# Patient Record
Sex: FEMALE | Race: WHITE | NOT HISPANIC OR LATINO | Employment: PART TIME | URBAN - METROPOLITAN AREA
[De-identification: names, ages, dates, MRNs, and addresses within clinical notes are randomized per-mention and may not be internally consistent; named-entity substitution may affect disease eponyms.]

---

## 2017-08-24 ENCOUNTER — ALLIED HEALTH/NURSE VISIT (OUTPATIENT)
Dept: FAMILY MEDICINE | Facility: CLINIC | Age: 37
End: 2017-08-24
Payer: COMMERCIAL

## 2017-08-24 VITALS — HEIGHT: 63 IN | WEIGHT: 212.7 LBS | BODY MASS INDEX: 37.69 KG/M2

## 2017-08-24 DIAGNOSIS — N91.2 AMENORRHEA: Primary | ICD-10-CM

## 2017-08-24 LAB — BETA HCG QUAL IFA URINE: POSITIVE

## 2017-08-24 PROCEDURE — 84703 CHORIONIC GONADOTROPIN ASSAY: CPT | Performed by: PHYSICIAN ASSISTANT

## 2017-08-24 PROCEDURE — 99207 ZZC NO CHARGE NURSE ONLY: CPT

## 2017-08-24 NOTE — MR AVS SNAPSHOT
"              After Visit Summary   2017    Josette Smith    MRN: [id number]           Patient Information     Date Of Birth          1980        Visit Information        Provider Department      2017 1:30 PM NL RN TEAM A, [facility]        Today's Diagnoses     Amenorrhea    -  1       Follow-ups after your visit        Who to contact     If you have questions or need follow up information about today's clinic visit or your schedule please contact [facility] directly at [phone].  Normal or non-critical lab and imaging results will be communicated to you by MyChart, letter or phone within 4 business days after the clinic has received the results. If you do not hear from us within 7 days, please contact the clinic through [employer] or phone. If you have a critical or abnormal lab result, we will notify you by phone as soon as possible.  Submit refill requests through Queue-it or call your pharmacy and they will forward the refill request to us. Please allow 3 business days for your refill to be completed.          Additional Information About Your Visit        MyChart Information     Queue-it lets you send messages to your doctor, view your test results, renew your prescriptions, schedule appointments and more. To sign up, go to [email or web address] . Click on \"Log in\" on the left side of the screen, which will take you to the Welcome page. Then click on \"Sign up Now\" on the right side of the page.     You will be asked to enter the access code listed below, as well as some personal information. Please follow the directions to create your username and password.     Your access code is: [email or web address]  Expires: 2017  2:05 PM     Your access code will  in 90 days. If you need help or a new code, please call your [facility] or [phone].        Care EveryWhere ID     This is your Care EveryWhere ID. This could be used by other organizations to " "access your [place] medical records  [id number]        Your Vitals Were     Height Last Period BMI (Body Mass Index)             5' 3\" (1.6 m) 06/27/2017 (Exact Date) 37.68 kg/m2          Blood Pressure from Last 3 Encounters:   10/26/16 122/78    Weight from Last 3 Encounters:   08/24/17 212 lb 11.2 oz (96.5 kg)   10/26/16 199 lb (90.3 kg)              We Performed the Following     Beta HCG qual IFA urine        Primary Care Provider Office Phone # Fax #    Diana Velasco PA-C [phone] [address] [place] [address]        Equal Access to Services     ROXY DUMONT : Hadii adi arriagao Soomaali, waaxda luqadaha, qaybta kaalmada adeegyada, seth patterson . So [facility] [phone].    ATENCIÓN: Si habla español, tiene a hitchcock disposición servicios gratuitos de asistencia lingüística. Llame al [phone].    We comply with applicable federal civil rights laws and [place] laws. We do not discriminate on the basis of race, color, national origin, age, disability sex, sexual orientation or gender identity.            Thank you!     Thank you for choosing [facility]  for your care. Our goal is always to provide you with excellent care. Hearing back from our patients is one way we can continue to improve our services. Please take a few minutes to complete the written survey that you may receive in the mail after your visit with us. Thank you!             Your Updated Medication List - Protect others around you: Learn how to safely use, store and throw away your medicines at www.disposemymeds.org.          This list is accurate as of: 8/24/17  2:05 PM.  Always use your most recent med list.                   Brand Name Dispense Instructions for use Diagnosis    ARMOUR THYROID 120 MG tablet   Generic drug:  thyroid      Take 120 mg by mouth daily          "

## 2017-08-24 NOTE — NURSING NOTE
"Josette Smith is a 37 year old here today for a pregnancy test.  LMP: Patient's last menstrual period was 2017 (exact date).  Wt: 212 lbs 11.2 oz.    Symptoms include weight gain, breast tenderness, absence of menses and fatigue.    Josette informed of positive pregnancy test results. MIRNA: 16    Educational advice given: nutrition, smoking and drugs & alcohol.    Current medications reviewed: Yes, just on [place]   Thyroid medications and started PNV recently , like 2 weeks ago     Previous pregnancy history remarkable for:  P:3 \" First delivery was a  [place] #2and #3 V-Backs. They were delivered by Midwives at [facility]..    Plan: schedule appointment with OB Educator and/or OB class, follow-up appointment with Dr. George for pre- care, take multivitamin or pre-byron vitamins, OB Education packet given and  Instructed to sign release of medical information to obtain OB record. .    She is to call back if she has any questions or concerns.  She is advised to notify a provider immediately if she experiences any severe cramping or abdominal pain or any vaginal bleeding.    "

## 2017-08-28 ENCOUNTER — PRENATAL OFFICE VISIT (OUTPATIENT)
Dept: FAMILY MEDICINE | Facility: CLINIC | Age: 37
End: 2017-08-28
Payer: COMMERCIAL

## 2017-08-28 VITALS
BODY MASS INDEX: 37.56 KG/M2 | DIASTOLIC BLOOD PRESSURE: 84 MMHG | WEIGHT: 212 LBS | HEIGHT: 63 IN | SYSTOLIC BLOOD PRESSURE: 120 MMHG

## 2017-08-28 DIAGNOSIS — O09.529 SUPERVISION OF HIGH-RISK PREGNANCY OF ELDERLY MULTIGRAVIDA: Primary | ICD-10-CM

## 2017-08-28 LAB
ABO + RH BLD: NORMAL
ABO + RH BLD: NORMAL
ALBUMIN UR-MCNC: NEGATIVE MG/DL
APPEARANCE UR: NORMAL
BILIRUB UR QL STRIP: NEGATIVE
BLD GP AB SCN SERPL QL: NORMAL
BLOOD BANK CMNT PATIENT-IMP: NORMAL
COLOR UR AUTO: YELLOW
ERYTHROCYTE [DISTWIDTH] IN BLOOD BY AUTOMATED COUNT: 14 % (ref 10–15)
GLUCOSE UR STRIP-MCNC: NEGATIVE MG/DL
HCT VFR BLD AUTO: 43.2 % (ref 35–47)
HGB BLD-MCNC: 13.9 G/DL (ref 11.7–15.7)
HGB UR QL STRIP: NEGATIVE
KETONES UR STRIP-MCNC: NEGATIVE MG/DL
LEUKOCYTE ESTERASE UR QL STRIP: NEGATIVE
MCH RBC QN AUTO: 28.5 PG (ref 26.5–33)
MCHC RBC AUTO-ENTMCNC: 32.2 G/DL (ref 31.5–36.5)
MCV RBC AUTO: 89 FL (ref 78–100)
NITRATE UR QL: NEGATIVE
PH UR STRIP: 7 PH (ref 5–7)
PLATELET # BLD AUTO: 315 10E9/L (ref 150–450)
RBC # BLD AUTO: 4.87 10E12/L (ref 3.8–5.2)
RBC #/AREA URNS AUTO: 0 /HPF (ref 0–2)
SOURCE: NORMAL
SP GR UR STRIP: 1.01 (ref 1–1.03)
SPECIMEN EXP DATE BLD: NORMAL
UROBILINOGEN UR STRIP-MCNC: 0 MG/DL (ref 0–2)
WBC # BLD AUTO: 10.5 10E9/L (ref 4–11)
WBC #/AREA URNS AUTO: <1 /HPF (ref 0–2)

## 2017-08-28 PROCEDURE — 87340 HEPATITIS B SURFACE AG IA: CPT | Performed by: FAMILY MEDICINE

## 2017-08-28 PROCEDURE — 86780 TREPONEMA PALLIDUM: CPT | Performed by: FAMILY MEDICINE

## 2017-08-28 PROCEDURE — 99207 ZZC NO CHARGE NURSE ONLY: CPT

## 2017-08-28 PROCEDURE — 36415 COLL VENOUS BLD VENIPUNCTURE: CPT | Performed by: FAMILY MEDICINE

## 2017-08-28 PROCEDURE — 86901 BLOOD TYPING SEROLOGIC RH(D): CPT | Performed by: FAMILY MEDICINE

## 2017-08-28 PROCEDURE — 87389 HIV-1 AG W/HIV-1&-2 AB AG IA: CPT | Performed by: FAMILY MEDICINE

## 2017-08-28 PROCEDURE — 85027 COMPLETE CBC AUTOMATED: CPT | Performed by: FAMILY MEDICINE

## 2017-08-28 PROCEDURE — 81001 URINALYSIS AUTO W/SCOPE: CPT | Performed by: FAMILY MEDICINE

## 2017-08-28 PROCEDURE — 86762 RUBELLA ANTIBODY: CPT | Performed by: FAMILY MEDICINE

## 2017-08-28 PROCEDURE — 86900 BLOOD TYPING SEROLOGIC ABO: CPT | Performed by: FAMILY MEDICINE

## 2017-08-28 PROCEDURE — 86850 RBC ANTIBODY SCREEN: CPT | Performed by: FAMILY MEDICINE

## 2017-08-28 NOTE — MR AVS SNAPSHOT
After Visit Summary   8/28/2017    Josette Smith    MRN: [id number]           Patient Information     Date Of Birth          1980        Visit Information        Provider Department      8/28/2017 4:00 PM NL OB INTAKE [facility]        Today's Diagnoses     Supervision of high-risk pregnancy of elderly multigravida    -  1       Follow-ups after your visit        Your next 10 appointments already scheduled     Aug 30, 2017 11:15 AM CDT   US OB < 14 WEEKS SINGLE with PHUS1   [facility] Ultrasound ([facility])    [address] [place] [id number] [phone]           Please bring a list of your medicines (including vitamins, minerals and over-the-counter drugs). Also, tell your doctor about any allergies you may have. Wear comfortable clothes and leave your valuables at home.  If you re less than 20 weeks drink four 8-ounce glasses of fluid an hour before your exam. If you need to empty your bladder before your exam, try to release only a little urine. Then, drink another glass of fluid.  You may have up to two family members in the exam room. If you bring a small child, an adult must be there to care for him or her.  Please call the Imaging Department at your exam site with any questions.              Future tests that were ordered for you today     Open Future Orders        Priority Expected Expires Ordered    US OB < 14 Weeks Single Routine  8/28/2018 8/28/2017    Glucose tolerance, gest screen, 1 hour Routine  8/28/2018 8/28/2017            Who to contact     If you have questions or need follow up information about today's clinic visit or your schedule please contact [facility] directly at [phone].  Normal or non-critical lab and imaging results will be communicated to you by MyChart, letter or phone within 4 business days after the clinic has received the results. If you do not hear from us within 7 days, please  "contact the clinic through [employer] or phone. If you have a critical or abnormal lab result, we will notify you by phone as soon as possible.  Submit refill requests through [employer] or call your pharmacy and they will forward the refill request to us. Please allow 3 business days for your refill to be completed.          Additional Information About Your Visit        [employer] Information     [employer] lets you send messages to your doctor, view your test results, renew your prescriptions, schedule appointments and more. To sign up, go to [email or web address] . Click on \"Log in\" on the left side of the screen, which will take you to the Welcome page. Then click on \"Sign up Now\" on the right side of the page.     You will be asked to enter the access code listed below, as well as some personal information. Please follow the directions to create your username and password.     Your access code is: -[email or web address]  Expires: 2017  2:05 PM     Your access code will  in 90 days. If you need help or a new code, please call your [place] clinic or [phone].        Care EveryWhere ID     This is your Care EveryWhere ID. This could be used by other organizations to access your [place] medical records  [id number]        Your Vitals Were     Height Last Period BMI (Body Mass Index)             5' 3\" (1.6 m) 2017 (Exact Date) 37.55 kg/m2          Blood Pressure from Last 3 Encounters:   17 120/84   10/26/16 122/78    Weight from Last 3 Encounters:   17 212 lb (96.2 kg)   17 212 lb 11.2 oz (96.5 kg)   10/26/16 199 lb (90.3 kg)              We Performed the Following     *UA reflex to Microscopic and Culture ([place]; [facility]; [facility]; [facility]; [facility]; [facility]; [place]; [place])     ABO/Rh type and screen     Anti treponema EIA     CBC WITH PLATELETS     HEPATITIS B SURFACE ANTIGEN     HIV Antigen Antibody Combo     Rubella Antibody IgG Quantitative        " Primary Care Provider Office Phone # Fax #    Diana Velasco PA-C [phone] [address] [place] [address]        Equal Access to Services     ROXY DUMONT : Luisa jorge corinao Soyoanali, waaxda luqadaha, qaybta kaalmada adececiliayada, seth vogel laMichaelricki cervantes. So [facility] [phone].    ATENCIÓN: Si habla español, tiene a hitchcock disposición servicios gratuitos de asistencia lingüística. Llame al [phone].    We comply with applicable federal civil rights laws and [place] laws. We do not discriminate on the basis of race, color, national origin, age, disability sex, sexual orientation or gender identity.            Thank you!     Thank you for choosing [facility]  for your care. Our goal is always to provide you with excellent care. Hearing back from our patients is one way we can continue to improve our services. Please take a few minutes to complete the written survey that you may receive in the mail after your visit with us. Thank you!             Your Updated Medication List - Protect others around you: Learn how to safely use, store and throw away your medicines at www.disposemymeds.org.          This list is accurate as of: 8/28/17  7:31 PM.  Always use your most recent med list.                   Brand Name Dispense Instructions for use Diagnosis    ARMOUR THYROID 120 MG tablet   Generic drug:  thyroid      Take 120 mg by mouth daily

## 2017-08-28 NOTE — NURSING NOTE
8w6d  Pt here for first OB intake visit. POC discussed and blue folder reviewed. All questions answered. Early ultrasound for size and dates. This is an unplanned pregnancy and patient is not sure of dates. Pt will need to get a one hour GTT due to risk factors for GD. This is ordered in [facility] and she will get it done at her ultrasound this week or with her appt with Dr George.. She did get her other OB labs done today. She has had three deliveries and one was a . Discussed that our facility does not routinely do VBACS and that she will need to discuss this with her doctor and her case will go for a review to see if she is eligible for a . Pt states understanding and states that if she is not able to do a  here she will transfer care to a facility that does them. She also states that her BP usually runs borderline high. It is 120/84 today.       Harpreet Loyd, RN, BSN

## 2017-08-28 NOTE — PROGRESS NOTES
1. Have you had chicken pox?   Yes    Genetic Screening    Has the patient, baby's father, or anyone in either family had:     1. Thalassemia and and MCV result less than 80?No   2.  Neural tube defect? No   3.  Congenital heart defect?No   4. Down's syndrome?No   5.  Silver-Sachs disease?No   6. Sickle Cell disease or trait?No   7. Hemophilia or other inherited problems of blood?No   8. Muscular dystropy?No   9.  Cystic fibrosis?No  10. [place]'s Chorea?No  11. Mental Retardation or autism?  Yes, Pt has an uncle that was mentally handicapped.         If yes, was the person tested for Fragile X?   12. Any other inherited genetic or chromosomal disorders?No  13. Metabolic disorders such as diabetes or PKU?Yes, pt maternal grandfather had IDDM.  14. A child born with defects not listed above?No  15. Recurrent pregnancy loss or stillbirth?No  16.  Has the patient had any medications/street drugs/alcohol since her last menstrual period?Yes, had a few drinks before she knew she was pregnant.  18.  Does the patient or baby's father have any other genetic risks. No   Baby Friendly Education    Discussed benefits of breastfeeding for mom and baby, risks of artificial feeding, importance of exclusive breastfeeding for the first six months, non pharmacologic pain relief methods for labor, importance of early skin to skin contact, importance of early initiation of breastfeeding, importance of rooming in, importance of early and frequent nursing and effective positioning and attachment.      Harpreet Loyd RN, BSN

## 2017-08-29 LAB
HBV SURFACE AG SERPL QL IA: NONREACTIVE
HIV 1+2 AB+HIV1 P24 AG SERPL QL IA: NONREACTIVE
RUBV IGG SERPL IA-ACNC: 65 IU/ML
T PALLIDUM IGG+IGM SER QL: NEGATIVE

## 2017-08-29 NOTE — PROGRESS NOTES
Notify her that her labs are normal, will review in more detail at her first OB visit.   Angelica George MD

## 2017-08-30 ENCOUNTER — HOSPITAL ENCOUNTER (OUTPATIENT)
Dept: ULTRASOUND IMAGING | Facility: CLINIC | Age: 37
Discharge: HOME | End: 2017-08-30
Attending: FAMILY MEDICINE | Admitting: FAMILY MEDICINE
Payer: COMMERCIAL

## 2017-08-30 DIAGNOSIS — O09.529 SUPERVISION OF HIGH-RISK PREGNANCY OF ELDERLY MULTIGRAVIDA: ICD-10-CM

## 2017-08-30 PROCEDURE — 76801 OB US < 14 WKS SINGLE FETUS: CPT

## 2017-09-01 ENCOUNTER — TELEPHONE (OUTPATIENT)
Dept: FAMILY MEDICINE | Facility: CLINIC | Age: 37
End: 2017-09-01

## 2017-09-01 DIAGNOSIS — O20.0 THREATENED ABORTION: ICD-10-CM

## 2017-09-01 DIAGNOSIS — O20.0 THREATENED ABORTION: Primary | ICD-10-CM

## 2017-09-01 LAB — B-HCG SERPL-ACNC: 7087 IU/L (ref 0–5)

## 2017-09-01 PROCEDURE — 36415 COLL VENOUS BLD VENIPUNCTURE: CPT | Performed by: FAMILY MEDICINE

## 2017-09-01 PROCEDURE — 84702 CHORIONIC GONADOTROPIN TEST: CPT | Performed by: FAMILY MEDICINE

## 2017-09-01 NOTE — TELEPHONE ENCOUNTER
I called Josette with her u/s results.  Baby seen but no definite heartbeat. I'm going to have her come in today for an HCG level, and then will repeat her ultrasound and HCG level next week.  See orders. She is going to call for ultrasound appointment.   Angelica George MD

## 2017-09-05 ENCOUNTER — HOSPITAL ENCOUNTER (OUTPATIENT)
Dept: ULTRASOUND IMAGING | Facility: CLINIC | Age: 37
Discharge: HOME | End: 2017-09-05
Attending: FAMILY MEDICINE | Admitting: FAMILY MEDICINE
Payer: COMMERCIAL

## 2017-09-05 DIAGNOSIS — O02.1 MISSED ABORTION: Primary | ICD-10-CM

## 2017-09-05 DIAGNOSIS — O20.0 THREATENED ABORTION: ICD-10-CM

## 2017-09-05 LAB — B-HCG SERPL-ACNC: 5822 IU/L (ref 0–5)

## 2017-09-05 PROCEDURE — 36415 COLL VENOUS BLD VENIPUNCTURE: CPT | Performed by: FAMILY MEDICINE

## 2017-09-05 PROCEDURE — 76801 OB US < 14 WKS SINGLE FETUS: CPT

## 2017-09-05 PROCEDURE — 84702 CHORIONIC GONADOTROPIN TEST: CPT | Performed by: FAMILY MEDICINE

## 2017-09-05 NOTE — PROGRESS NOTES
Josette notified of miscarriage. She states she doesn't want to do anything at this time. Feels she will pass it on her own.  Would like to update me in 1 week, knows her options of D&C, pills.  Discussed risks of infection, bleeding, symptoms to watch for. Will update me in 1 week. Will get HCG checked every 1-2 weeks until 0. Discussed importance of follow up.   Angelica George MD

## 2018-05-11 ENCOUNTER — OFFICE VISIT (OUTPATIENT)
Dept: FAMILY MEDICINE | Facility: CLINIC | Age: 38
End: 2018-05-11
Payer: COMMERCIAL

## 2018-05-11 ENCOUNTER — HOSPITAL ENCOUNTER (OUTPATIENT)
Dept: GENERAL RADIOLOGY | Facility: CLINIC | Age: 38
Discharge: HOME | End: 2018-05-11
Attending: FAMILY MEDICINE | Admitting: FAMILY MEDICINE
Payer: COMMERCIAL

## 2018-05-11 VITALS
DIASTOLIC BLOOD PRESSURE: 82 MMHG | HEART RATE: 97 BPM | OXYGEN SATURATION: 96 % | TEMPERATURE: 97.5 F | WEIGHT: 215.3 LBS | HEIGHT: 63 IN | BODY MASS INDEX: 38.15 KG/M2 | SYSTOLIC BLOOD PRESSURE: 124 MMHG

## 2018-05-11 DIAGNOSIS — S99.921A INJURY OF TOE, RIGHT, INITIAL ENCOUNTER: ICD-10-CM

## 2018-05-11 DIAGNOSIS — S92.514A CLOSED NONDISPLACED FRACTURE OF PROXIMAL PHALANX OF LESSER TOE OF RIGHT FOOT, INITIAL ENCOUNTER: Primary | ICD-10-CM

## 2018-05-11 PROCEDURE — 73660 X-RAY EXAM OF TOE(S): CPT | Mod: TC,RT

## 2018-05-11 PROCEDURE — 99214 OFFICE O/P EST MOD 30 MIN: CPT | Performed by: FAMILY MEDICINE

## 2018-05-11 ASSESSMENT — PAIN SCALES - GENERAL: PAINLEVEL: SEVERE PAIN (6)

## 2018-05-11 NOTE — PROGRESS NOTES
SUBJECTIVE:   Josette Smith is a 38 year old female who presents to clinic today for the following health issues:      Toe Pain    Onset: 5/10/2018    Description:   Location: right baby toe  Character: Sharp and Dull ache    Intensity: moderate    Progression of Symptoms: worse    Accompanying Signs & Symptoms:  Other symptoms: swelling, redness and discoloration of toe    History:   Previous similar pain: no       Precipitating factors:   Trauma or overuse: YES    Alleviating factors:  Improved by: rest/inactivity and Ibuprofen    Therapies Tried and outcome: ibuprofen, ice            Problem list and histories reviewed & adjusted, as indicated.  Additional history: as documented        Reviewed and updated as needed this visit by clinical staff  Tobacco  Allergies  Meds  Med Hx  Surg Hx  Fam Hx  Soc Hx      Reviewed and updated as needed this visit by Provider        SUBJECTIVE:  Josette  is a 38 year old female who presents for: Injury to her toe.  Jammed her little toe into the corner of a cabinet last night.  Quite painful.  Swollen and ecchymosis this morning.    Past Medical History:   Diagnosis Date     Acute Lyme disease age 12     History of blood transfusion     04, after      Hypothyroid     Hashimoto's     Past Surgical History:   Procedure Laterality Date     ABDOMEN SURGERY             C/SECTION, LOW TRANSVERSE       GENITOURINARY SURGERY      Urethra surgery at age 1     OTHER SURGICAL HISTORY  age 1    Pt had her urethra dilated at 1 yrs of age     Social History   Substance Use Topics     Smoking status: Never Smoker     Smokeless tobacco: Never Used     Alcohol use 0.0 oz/week     0 Standard drinks or equivalent per week      Comment: On occasion      Current Outpatient Prescriptions   Medication Sig Dispense Refill     thyroid (ARMOUR THYROID) 120 MG tablet Take 120 mg by mouth daily         REVIEW OF SYSTEMS:   5 point ROS negative except as noted  "above in HPI, including Gen., Resp, CV, GI &  system review.     OBJECTIVE:  Vitals: /82 (Cuff Size: Adult Large)  Pulse 97  Temp 97.5  F (36.4  C) (Temporal)  Ht 5' 3\" (1.6 m)  Wt 215 lb 4.8 oz (97.7 kg)  LMP 06/27/2017 (Exact Date)  SpO2 96%  Breastfeeding? No  BMI 38.14 kg/m2  BMI= Body mass index is 38.14 kg/(m^2).  She is appears in no distress.  Toe is swollen there is some ecchymosis and no open area.  Alignment looks normal.  X-ray shows a transverse fracture across the base of the proximal phalanx nondisplaced.    ASSESSMENT:  Nondisplaced fracture of the proximal phalanx of the little toe    PLAN:  Edmundo tape stiff soled sandal follow-up if not improving.  She is using ibuprofen for pain which is helpful.        Edwardo Nation MD  [facility]              "

## 2018-05-11 NOTE — MR AVS SNAPSHOT
"              After Visit Summary   5/11/2018    Josette Smith    MRN: [id number]           Patient Information     Date Of Birth          1980        Visit Information        Provider Department      5/11/2018 8:20 AM Edwardo Nation MD [facility]        Today's Diagnoses     Closed nondisplaced fracture of proximal phalanx of lesser toe of right foot, initial encounter    -  1    Injury of toe, right, initial encounter           Follow-ups after your visit        Future tests that were ordered for you today     Open Future Orders        Priority Expected Expires Ordered    X-RAY TOE RIGHT G/E 2 VIEWS [[id number]] Routine 5/11/2018 5/11/2019 5/11/2018            Who to contact     If you have questions or need follow up information about today's clinic visit or your schedule please contact [facility] directly at [phone].  Normal or non-critical lab and imaging results will be communicated to you by MyChart, letter or phone within 4 business days after the clinic has received the results. If you do not hear from us within 7 days, please contact the clinic through MyChart or phone. If you have a critical or abnormal lab result, we will notify you by phone as soon as possible.  Submit refill requests through [employer] or call your pharmacy and they will forward the refill request to us. Please allow 3 business days for your refill to be completed.          Additional Information About Your Visit        MyChart Information     [employer] lets you send messages to your doctor, view your test results, renew your prescriptions, schedule appointments and more. To sign up, go to [email or web address] . Click on \"Log in\" on the left side of the screen, which will take you to the Welcome page. Then click on \"Sign up Now\" on the right side of the page.     You will be asked to enter the access code listed below, as well as some personal information. Please follow the directions to create " "your username and password.     Your access code is: [email or web address]  Expires: 2018  9:24 AM     Your access code will  in 90 days. If you need help or a new code, please call your [place] clinic or [phone].        Care EveryWhere ID     This is your Care EveryWhere ID. This could be used by other organizations to access your [place] medical records  [id number]        Your Vitals Were     Pulse Temperature Height Last Period Pulse Oximetry Breastfeeding?    97 97.5  F (36.4  C) (Temporal) 5' 3\" (1.6 m) 2017 (Exact Date) 96% No    BMI (Body Mass Index)                   38.14 kg/m2            Blood Pressure from Last 3 Encounters:   18 124/82   17 120/84   10/26/16 122/78    Weight from Last 3 Encounters:   18 215 lb 4.8 oz (97.7 kg)   17 212 lb (96.2 kg)   17 212 lb 11.2 oz (96.5 kg)               Primary Care Provider Office Phone # Fax #    Diana Velasco PA-C [phone] [address] [place] [address]        Equal Access to Services     ROXY DUMONT AH: Hadii adi ku hadasho Soomaali, waaxda luqadaha, qaybta kaalmada adeegyada, waxay idiin hayjacobn adececilia vogel laepiafnio cervantes. So [facility] [phone].    ATENCIÓN: Si habla español, tiene a hitchcock disposición servicios gratuitos de asistencia lingüística. patience al [phone].    We comply with applicable federal civil rights laws and [place] laws. We do not discriminate on the basis of race, color, national origin, age, disability, sex, sexual orientation, or gender identity.            Thank you!     Thank you for choosing [facility]  for your care. Our goal is always to provide you with excellent care. Hearing back from our patients is one way we can continue to improve our services. Please take a few minutes to complete the written survey that you may receive in the mail after your visit with us. Thank you!             Your Updated Medication List - Protect others around you: " Learn how to safely use, store and throw away your medicines at www.disposemymeds.org.          This list is accurate as of 5/11/18  9:24 AM.  Always use your most recent med list.                   Brand Name Dispense Instructions for use Diagnosis    ARMOUR THYROID 120 MG tablet   Generic drug:  thyroid      Take 120 mg by mouth daily

## 2019-12-24 ENCOUNTER — OFFICE VISIT (OUTPATIENT)
Dept: URGENT CARE | Facility: RETAIL CLINIC | Age: 39
End: 2019-12-24
Payer: COMMERCIAL

## 2019-12-24 VITALS
HEART RATE: 99 BPM | TEMPERATURE: 98.4 F | DIASTOLIC BLOOD PRESSURE: 90 MMHG | OXYGEN SATURATION: 96 % | SYSTOLIC BLOOD PRESSURE: 150 MMHG

## 2019-12-24 DIAGNOSIS — J02.0 ACUTE STREPTOCOCCAL PHARYNGITIS: Primary | ICD-10-CM

## 2019-12-24 PROCEDURE — 99213 OFFICE O/P EST LOW 20 MIN: CPT | Performed by: INTERNAL MEDICINE

## 2019-12-24 RX ORDER — PENICILLIN V POTASSIUM 500 MG/1
500 TABLET, FILM COATED ORAL 3 TIMES DAILY
Qty: 30 TABLET | Refills: 0 | Status: SHIPPED | OUTPATIENT
Start: 2019-12-24 | End: 2020-01-03

## 2019-12-24 NOTE — PROGRESS NOTES
[facility]        Augustine Vicente MD, MPH  12/24/2019        History:      Josette Smith is a 39 year old female with a chief complaint of sore throat.  Onset of symptoms was 1 day(s) ago.    No dyspnea or wheezing or cough  No vomiting    No diarrhea  No abdominal pain  Eating and drinking well  No rash.         Assessment and Plan:         Acute streptococcal pharyngitis    - penicillin V (VEETID) 500 MG tablet; Take 1 tablet (500 mg) by mouth 3 times daily for 10 days  Dispense: 30 tablet; Refill: 0  Advised patient to increase/encourage fluid intake and rest.  Advised to discard current tooth brush.  Advised to stay home and rest today and tomorrow.  Tylenol  Every 6 hours as needed alternating with ibuprofen every 6 hours as needed for pain and or fever.  F/u w PCP in 4-5 days, earlier if symptoms worsen.                   Physical Exam:      BP (!) 150/90   Pulse 99   Temp 98.4  F (36.9  C) (Oral)   SpO2 96%      Constitutional: Patient is in no distress The patient is pleasant and cooperative.   HEENT: Head:  Head is atraumatic, normocephalic.    Eyes: Pupils are equal, round and reactive to light and accomodation.  Sclera is non-icteric. No conjunctival injection, or exudate noted. Extraocular motion is intact. Visual acuity is intact bilaterally.  Ears:  External acoustic canals are patent and clear.  There is no erythema and bulging( exudate)  of the ( R/L ) tympanic membrane(s ).   Nose:  No Nasal congestion, drainage or mucosal ulceration is noted.  Throat:  Oral mucosa is moist.  No oral lesions are noted.  Posterior pharyngeal hyperemia w exudate noted.     Neck Supple.  There is cervical lymphadenopathy.  No nuchal rigidity noted.  There is no meningismus.     Cardiovascular:  Chest Wall: Heart is regular to rate and rhythm.  No murmur is noted. Recheck IJ=952/84.      Lungs: Clear in the anterior and posterior pulmonary fields.   Abdomen: Soft and non-tender.    Back No  flank tenderness is noted.   Extremeties No edema, no calf tenderness.   Neuro: No focal deficit.   Skin No petechiae or purpura is noted.  There is no rash.   Mood Normal              Data:      All new lab and imaging data was reviewed.   No results found for any visits on 12/24/19.

## 2020-03-10 ENCOUNTER — HEALTH MAINTENANCE LETTER (OUTPATIENT)
Age: 40
End: 2020-03-10

## 2020-12-27 ENCOUNTER — HEALTH MAINTENANCE LETTER (OUTPATIENT)
Age: 40
End: 2020-12-27

## 2021-04-24 ENCOUNTER — HEALTH MAINTENANCE LETTER (OUTPATIENT)
Age: 41
End: 2021-04-24

## 2021-06-17 ENCOUNTER — OFFICE VISIT (OUTPATIENT)
Dept: INTERNAL MEDICINE | Facility: CLINIC | Age: 41
End: 2021-06-17
Payer: COMMERCIAL

## 2021-06-17 VITALS
WEIGHT: 227 LBS | RESPIRATION RATE: 18 BRPM | TEMPERATURE: 97.6 F | HEIGHT: 63 IN | HEART RATE: 90 BPM | BODY MASS INDEX: 40.22 KG/M2 | OXYGEN SATURATION: 99 % | SYSTOLIC BLOOD PRESSURE: 128 MMHG | DIASTOLIC BLOOD PRESSURE: 84 MMHG

## 2021-06-17 DIAGNOSIS — E66.01 MORBID OBESITY (H): ICD-10-CM

## 2021-06-17 DIAGNOSIS — M72.2 PLANTAR FASCIITIS: Primary | ICD-10-CM

## 2021-06-17 PROCEDURE — 99213 OFFICE O/P EST LOW 20 MIN: CPT | Performed by: INTERNAL MEDICINE

## 2021-06-17 RX ORDER — PREDNISONE 20 MG/1
40 TABLET ORAL DAILY
Qty: 10 TABLET | Refills: 0 | Status: SHIPPED | OUTPATIENT
Start: 2021-06-17

## 2021-06-17 ASSESSMENT — PAIN SCALES - GENERAL: PAINLEVEL: NO PAIN (1)

## 2021-06-17 ASSESSMENT — MIFFLIN-ST. JEOR: SCORE: 1653.8

## 2021-06-17 NOTE — PROGRESS NOTES
"        Yusuf Finch is a 41 year old who presents for the following health issues     HPI     Chief Complaint   Patient presents with     Foot Injury     right heel pain on and off for 1 month. No known injury         EMR reviewed including:             Complaint, History of Chief Complaint, Corresponding Review of Systems, and Complaint Specific Physical Examination.    #1   Pain on plantar aspect of right heel.  Reproducible with palpation of the origin of the plantar fascia.  Patient wearing flat cheap shoes always.  Has tried over-the-counter NSAIDs with minimal benefit.  No history of trauma or injury to the foot.       Exam:   MS: Minimal crepitance is noted in the extremities. No deformity is present. Muscle strength is appropriate and equal bilaterally. No acute joint erythema or swelling is present.  Point tenderness of the plantar fascia is typical for plantar fasciitis.      Vital Signs:   /84 (BP Location: Left arm, Patient Position: Sitting, Cuff Size: Adult Regular)   Pulse 90   Temp 97.6  F (36.4  C) (Temporal)   Resp 18   Ht 1.6 m (5' 3\")   Wt 103 kg (227 lb)   LMP 05/17/2021   SpO2 99%   BMI 40.21 kg/m               Decision Making    Problem and Complexity     1. Plantar fasciitis  Recommend the \"old Barraza soup can trick\"  Discussed footwear with proper arch support  Discussed options for orthotics  Discussed oral steroid versus fascia injection.   Patient really not into the idea of injection at this time.  - predniSONE (DELTASONE) 20 MG tablet; Take 2 tablets (40 mg) by mouth daily  Dispense: 10 tablet; Refill: 0    2. Morbid obesity (H)  Recommend weight loss responsible caloric restriction and exercise.          ------------------------------------------------------------------------------------------------------------------------------  Data    4=1/3 5=2/3    1  >3  Specialty (external) notes reviewed:   None  Individual tests ordered (by provider) reviewed:   " None  Individual tests ordered (by provider):   Negative  Independent Historians Interview:   None  2  Review of outside (other providers) Tests:   None  3   Verbal Discussion with Specialists:    None    ----------------------------------------------------------------------------------------------------------------------------------  Risk   Prescription drug management:   Yes                                High risk for toxicity:   Decision regarding surgery:    None   Social determinants of health:   No   Decision regarding hospitalization:     None   Decision to withhold therapy:   None                             FOLLOW UP   I have asked the patient to make an appointment for followup with me in 2 weeks #1 improved            I have carefully explained the diagnosis and treatment options to the patient.  The patient has displayed an understanding of the above, and all subsequent questions were answered.      DO MAX Jama    Portions of this note were produced using [employer]  Although every attempt at real-time proof reading has been made, occasional grammar/syntax errors may have been missed.

## 2021-10-09 ENCOUNTER — HEALTH MAINTENANCE LETTER (OUTPATIENT)
Age: 41
End: 2021-10-09

## 2022-05-16 ENCOUNTER — HEALTH MAINTENANCE LETTER (OUTPATIENT)
Age: 42
End: 2022-05-16

## 2022-06-07 ENCOUNTER — TELEPHONE (OUTPATIENT)
Dept: FAMILY MEDICINE | Facility: CLINIC | Age: 42
End: 2022-06-07
Payer: COMMERCIAL

## 2022-06-07 NOTE — TELEPHONE ENCOUNTER
Please call patient.  She is on my schedule for physical and IUD.  I could do her physical but I do not place IUD.  Lenora Allen, CNP

## 2022-09-11 ENCOUNTER — HEALTH MAINTENANCE LETTER (OUTPATIENT)
Age: 42
End: 2022-09-11

## 2023-06-03 ENCOUNTER — HEALTH MAINTENANCE LETTER (OUTPATIENT)
Age: 43
End: 2023-06-03